# Patient Record
Sex: MALE | Race: WHITE | HISPANIC OR LATINO | Employment: FULL TIME | URBAN - METROPOLITAN AREA
[De-identification: names, ages, dates, MRNs, and addresses within clinical notes are randomized per-mention and may not be internally consistent; named-entity substitution may affect disease eponyms.]

---

## 2018-10-06 ENCOUNTER — OCCUPATIONAL HEALTH (OUTPATIENT)
Dept: URGENT CARE | Facility: CLINIC | Age: 28
End: 2018-10-06

## 2018-10-06 DIAGNOSIS — Z02.1 PRE-EMPLOYMENT DRUG SCREENING: Primary | ICD-10-CM

## 2018-10-06 PROCEDURE — 80305 DRUG TEST PRSMV DIR OPT OBS: CPT | Mod: S$GLB,,, | Performed by: EMERGENCY MEDICINE

## 2020-07-01 ENCOUNTER — HOSPITAL ENCOUNTER (EMERGENCY)
Facility: HOSPITAL | Age: 30
Discharge: HOME | End: 2020-07-01
Attending: EMERGENCY MEDICINE

## 2020-07-01 VITALS
WEIGHT: 250 LBS | HEART RATE: 97 BPM | DIASTOLIC BLOOD PRESSURE: 77 MMHG | SYSTOLIC BLOOD PRESSURE: 143 MMHG | TEMPERATURE: 98 F | OXYGEN SATURATION: 98 % | RESPIRATION RATE: 18 BRPM

## 2020-07-01 DIAGNOSIS — Z91.030 BEE STING ALLERGY: Primary | ICD-10-CM

## 2020-07-01 PROCEDURE — 25000003 PHARM REV CODE 250: Performed by: EMERGENCY MEDICINE

## 2020-07-01 PROCEDURE — 63600175 PHARM REV CODE 636 W HCPCS: Performed by: EMERGENCY MEDICINE

## 2020-07-01 PROCEDURE — 99283 EMERGENCY DEPT VISIT LOW MDM: CPT

## 2020-07-01 RX ORDER — EPINEPHRINE 0.3 MG/.3ML
1 INJECTION SUBCUTANEOUS
Qty: 1 DEVICE | Refills: 1 | Status: SHIPPED | OUTPATIENT
Start: 2020-07-01 | End: 2021-07-01

## 2020-07-01 RX ORDER — DIPHENHYDRAMINE HCL 25 MG
25 CAPSULE ORAL
Status: COMPLETED | OUTPATIENT
Start: 2020-07-01 | End: 2020-07-01

## 2020-07-01 RX ORDER — FAMOTIDINE 20 MG/1
20 TABLET, FILM COATED ORAL
Status: COMPLETED | OUTPATIENT
Start: 2020-07-01 | End: 2020-07-01

## 2020-07-01 RX ORDER — PREDNISONE 20 MG/1
60 TABLET ORAL
Status: COMPLETED | OUTPATIENT
Start: 2020-07-01 | End: 2020-07-01

## 2020-07-01 RX ADMIN — FAMOTIDINE 20 MG: 20 TABLET, FILM COATED ORAL at 03:07

## 2020-07-01 RX ADMIN — DIPHENHYDRAMINE HYDROCHLORIDE 25 MG: 25 CAPSULE ORAL at 03:07

## 2020-07-01 RX ADMIN — PREDNISONE 60 MG: 20 TABLET ORAL at 03:07

## 2020-07-01 NOTE — ED NOTES
Mouth/Throat: Oropharynx is clear and moist and mucous membranes are normal. No posterior oropharyngeal edema.   No facial edema.   Eyes: EOM are normal.   Neck: Neck supple. No stridor present. No tracheal deviation present. No JVD present.   No stridor.   Cardiovascular: Normal rate, regular rhythm, normal heart sounds and intact distal pulses. Exam reveals no gallop and no friction rub.    No murmur heard.  Pulmonary/Chest: Breath sounds normal. No respiratory distress. He has no wheezes. He has no rhonchi. He has no rales.   Clear breath sounds bilaterally. No wheezing.   Abdominal: Soft. Bowel sounds are normal. There is no abdominal tenderness.   Musculoskeletal: Normal range of motion.        Left hand: He exhibits normal range of motion. Left middle finger: Exhibits swelling.      Comments: Full ROM in left hand. No evidence of foreign body or bee sting in finger.    Neurological: He is alert and oriented to person, place, and time. No cranial nerve deficit.   Sensation intact to light touch.   Skin: Skin is warm and dry. Capillary refill takes less than 2 seconds. No rash noted.   Normal perfusion.

## 2020-07-01 NOTE — ED PROVIDER NOTES
Encounter Date: 7/1/2020    SCRIBE #1 NOTE: I, Alexa Swain, am scribing for, and in the presence of, Darion Gallardo MD.       History     Chief Complaint   Patient presents with    Allergic Reaction     pt states he was stung by a bee; took epi pen PTA; told by his PCP that he needed to come to ED for an evaluation. pt denies any respiratory sx. Swelling noted to left middle finger at bee sting site     Time seen by provider: 2:48 PM on 07/01/2020    Howard Butterfield is a 29 y.o. male with no PMHx who presents to the ED with an onset of allergic reaction that occurred 1 hour PTA. The patient reports getting stunk by a bee on his left middle finger. The patient is allergic to bees and was told by his PCP whenever he gets stung to seek medical attention. He noticed swelling to the finger, and used his EPI pen ~40 min PTA. Patient reports being stung by a bee when he was younger, and went into anaphylaxis shock. The patient denies any other rashes. Patient denies syncope, chest pain, nausea, vomiting, abdominal pain, difficulty breathing or any other symptoms at this time. No PSHx.      The history is provided by the patient.     Review of patient's allergies indicates:   Allergen Reactions    Bee pollens      No past medical history on file.  No past surgical history on file.  No family history on file.  Social History     Tobacco Use    Smoking status: Not on file   Substance Use Topics    Alcohol use: Not on file    Drug use: Not on file     Review of Systems   Constitutional: Negative for activity change, diaphoresis and fever.   HENT: Negative for drooling, facial swelling, rhinorrhea, sore throat and trouble swallowing.    Eyes: Negative for pain and visual disturbance.   Respiratory: Negative for cough, shortness of breath and stridor.    Cardiovascular: Negative for chest pain and leg swelling.   Gastrointestinal: Negative for abdominal distention, abdominal pain, constipation, nausea and vomiting.    Genitourinary: Negative for discharge, dysuria and hematuria.   Musculoskeletal: Positive for joint swelling. Negative for gait problem.   Skin: Positive for rash.   Neurological: Negative for seizures, syncope, facial asymmetry and headaches.   Psychiatric/Behavioral: Negative for hallucinations and suicidal ideas.       Physical Exam     Initial Vitals [07/01/20 1444]   BP Pulse Resp Temp SpO2   (!) 157/84 (!) 114 18 98.2 °F (36.8 °C) 99 %      MAP       --         Physical Exam    Nursing note and vitals reviewed.  Constitutional: He appears well-developed. No distress.   HENT:   Head: Normocephalic and atraumatic.   Nose: Nose normal.   Mouth/Throat: Oropharynx is clear and moist and mucous membranes are normal. No posterior oropharyngeal edema.   No facial edema.   Eyes: EOM are normal.   Neck: Neck supple. No stridor present. No tracheal deviation present. No JVD present.   No stridor.   Cardiovascular: Normal rate, regular rhythm, normal heart sounds and intact distal pulses. Exam reveals no gallop and no friction rub.    No murmur heard.  Pulmonary/Chest: Breath sounds normal. No respiratory distress. He has no wheezes. He has no rhonchi. He has no rales.   Clear breath sounds bilaterally. No wheezing.   Abdominal: Soft. Bowel sounds are normal. There is no abdominal tenderness.   Musculoskeletal: Normal range of motion.        Left hand: He exhibits normal range of motion. Left middle finger: Exhibits swelling.      Comments: Full ROM in left hand. No evidence of foreign body or bee sting in finger.    Neurological: He is alert and oriented to person, place, and time. No cranial nerve deficit.   Sensation intact to light touch.   Skin: Skin is warm and dry. Capillary refill takes less than 2 seconds. No rash noted.   Normal perfusion.   Psychiatric: He has a normal mood and affect.         ED Course   Procedures  Labs Reviewed - No data to display       Imaging Results    None          Medical Decision  Making:   History:   Old Medical Records: I decided to obtain old medical records.  ED Management:  28 yo M pmhx of allergic reaction to bee sting pw bee sting. Pt took epipen at home.   Negative for respiratory symptoms such as wheezing, SOB/respiratory distress  Negative for cardiovascular signs such as hypotension  Negative for GI symptoms such as nausea and diarrhea  Low concern for cellulitis. Not anaphylactic. Unlikely drug reaction, toxic shock syndrome, contact dermatitis.  Patient observed in the ER and had improvement of symptoms after being given Benadryl, Pepcid and steroids.              Scribe Attestation:   Scribe #1: I performed the above scribed service and the documentation accurately describes the services I performed. I attest to the accuracy of the note.      Attending Attestation:     Physician Attestation for Scribe:    I, Dr. Darion Gallardo, personally performed the services described in this documentation.   All medical record entries made by the scribe were at my direction and in my presence.   I have reviewed the chart and agree that the record is accurate and complete.   Darion Gallardo MD  4:35 PM 07/01/2020     DISCLAIMER: This note was prepared with [employer] voice recognition transcription software. Garbled syntax, mangled pronouns, and other bizarre constructions may be attributed to that software system.                        Clinical Impression:       ICD-10-CM ICD-9-CM   1. Bee sting allergy  Z91.030 V15.06             ED Disposition Condition    Discharge Stable        ED Prescriptions     Medication Sig Dispense Start Date End Date Auth. Provider    EPINEPHrine (EPIPEN) 0.3 mg/0.3 mL AtIn Inject 0.3 mLs (0.3 mg total) into the muscle as needed. 1 Device 7/1/2020 7/1/2021 Darion Gallardo MD        Follow-up Information     Follow up With Specialties Details Why Contact Info    [facility]  Go in 1 day  [address]  [facility] [address]  [phone]      [facility] Emergency Medicine Go to  As needed, If symptoms worsen [address]  [facility] [address] [phone]                                     Darion Gallardo MD  07/01/20 1640

## 2022-09-21 ENCOUNTER — OCCUPATIONAL HEALTH (OUTPATIENT)
Dept: URGENT CARE | Facility: CLINIC | Age: 32
End: 2022-09-21

## 2022-09-21 PROCEDURE — 80305 PR DRUG SCREEN - 1: ICD-10-PCS | Mod: S$GLB,,, | Performed by: EMERGENCY MEDICINE

## 2022-09-21 PROCEDURE — 80305 DRUG TEST PRSMV DIR OPT OBS: CPT | Mod: S$GLB,,, | Performed by: EMERGENCY MEDICINE

## 2023-04-02 ENCOUNTER — HOSPITAL ENCOUNTER (EMERGENCY)
Facility: HOSPITAL | Age: 33
Discharge: HOME | End: 2023-04-02
Attending: EMERGENCY MEDICINE
Payer: COMMERCIAL

## 2023-04-02 VITALS
WEIGHT: 250 LBS | HEART RATE: 71 BPM | SYSTOLIC BLOOD PRESSURE: 129 MMHG | TEMPERATURE: 98 F | DIASTOLIC BLOOD PRESSURE: 78 MMHG | RESPIRATION RATE: 16 BRPM | BODY MASS INDEX: 33.13 KG/M2 | HEIGHT: 73 IN | OXYGEN SATURATION: 96 %

## 2023-04-02 DIAGNOSIS — N20.0 KIDNEY STONE: Primary | ICD-10-CM

## 2023-04-02 DIAGNOSIS — I88.0 MESENTERIC ADENITIS: ICD-10-CM

## 2023-04-02 LAB
ALBUMIN SERPL BCP-MCNC: 3.6 G/DL (ref 3.5–5.2)
ALP SERPL-CCNC: 78 U/L (ref 55–135)
ALT SERPL W/O P-5'-P-CCNC: 15 U/L (ref 10–44)
ANION GAP SERPL CALC-SCNC: 8 MMOL/L (ref 8–16)
AST SERPL-CCNC: 14 U/L (ref 10–40)
BACTERIA #/AREA URNS HPF: ABNORMAL /HPF
BASOPHILS # BLD AUTO: 0.05 K/UL (ref 0–0.2)
BASOPHILS NFR BLD: 0.7 % (ref 0–1.9)
BILIRUB SERPL-MCNC: 0.4 MG/DL (ref 0.1–1)
BILIRUB UR QL STRIP: NEGATIVE
BUN SERPL-MCNC: 11 MG/DL (ref 6–20)
CALCIUM SERPL-MCNC: 9.1 MG/DL (ref 8.7–10.5)
CHLORIDE SERPL-SCNC: 107 MMOL/L (ref 95–110)
CLARITY UR: CLEAR
CO2 SERPL-SCNC: 27 MMOL/L (ref 23–29)
COLOR UR: YELLOW
CREAT SERPL-MCNC: 1.2 MG/DL (ref 0.5–1.4)
DIFFERENTIAL METHOD: ABNORMAL
EOSINOPHIL # BLD AUTO: 0.3 K/UL (ref 0–0.5)
EOSINOPHIL NFR BLD: 4.3 % (ref 0–8)
ERYTHROCYTE [DISTWIDTH] IN BLOOD BY AUTOMATED COUNT: 18.3 % (ref 11.5–14.5)
EST. GFR  (NO RACE VARIABLE): >60 ML/MIN/1.73 M^2
GLUCOSE SERPL-MCNC: 92 MG/DL (ref 70–110)
GLUCOSE UR QL STRIP: NEGATIVE
HCT VFR BLD AUTO: 37.4 % (ref 40–54)
HGB BLD-MCNC: 11 G/DL (ref 14–18)
HGB UR QL STRIP: ABNORMAL
IMM GRANULOCYTES # BLD AUTO: 0.01 K/UL (ref 0–0.04)
IMM GRANULOCYTES NFR BLD AUTO: 0.1 % (ref 0–0.5)
KETONES UR QL STRIP: NEGATIVE
LEUKOCYTE ESTERASE UR QL STRIP: NEGATIVE
LYMPHOCYTES # BLD AUTO: 2.8 K/UL (ref 1–4.8)
LYMPHOCYTES NFR BLD: 42 % (ref 18–48)
MCH RBC QN AUTO: 21.2 PG (ref 27–31)
MCHC RBC AUTO-ENTMCNC: 29.4 G/DL (ref 32–36)
MCV RBC AUTO: 72 FL (ref 82–98)
MICROSCOPIC COMMENT: ABNORMAL
MONOCYTES # BLD AUTO: 0.6 K/UL (ref 0.3–1)
MONOCYTES NFR BLD: 8.5 % (ref 4–15)
NEUTROPHILS # BLD AUTO: 3 K/UL (ref 1.8–7.7)
NEUTROPHILS NFR BLD: 44.4 % (ref 38–73)
NITRITE UR QL STRIP: NEGATIVE
NRBC BLD-RTO: 0 /100 WBC
PH UR STRIP: 6 [PH] (ref 5–8)
PLATELET # BLD AUTO: 215 K/UL (ref 150–450)
PMV BLD AUTO: 11.7 FL (ref 9.2–12.9)
POTASSIUM SERPL-SCNC: 4.1 MMOL/L (ref 3.5–5.1)
PROT SERPL-MCNC: 7.1 G/DL (ref 6–8.4)
PROT UR QL STRIP: NEGATIVE
RBC # BLD AUTO: 5.2 M/UL (ref 4.6–6.2)
RBC #/AREA URNS HPF: 7 /HPF (ref 0–4)
SODIUM SERPL-SCNC: 142 MMOL/L (ref 136–145)
SP GR UR STRIP: 1.01 (ref 1–1.03)
SQUAMOUS #/AREA URNS HPF: 1 /HPF
URN SPEC COLLECT METH UR: ABNORMAL
UROBILINOGEN UR STRIP-ACNC: NEGATIVE EU/DL
WBC # BLD AUTO: 6.74 K/UL (ref 3.9–12.7)
WBC #/AREA URNS HPF: 2 /HPF (ref 0–5)

## 2023-04-02 PROCEDURE — 36415 COLL VENOUS BLD VENIPUNCTURE: CPT | Performed by: EMERGENCY MEDICINE

## 2023-04-02 PROCEDURE — 81000 URINALYSIS NONAUTO W/SCOPE: CPT | Performed by: EMERGENCY MEDICINE

## 2023-04-02 PROCEDURE — 80053 COMPREHEN METABOLIC PANEL: CPT | Performed by: EMERGENCY MEDICINE

## 2023-04-02 PROCEDURE — 99284 EMERGENCY DEPT VISIT MOD MDM: CPT | Mod: 25

## 2023-04-02 PROCEDURE — 85025 COMPLETE CBC W/AUTO DIFF WBC: CPT | Performed by: EMERGENCY MEDICINE

## 2023-04-02 NOTE — ED NOTES
Assumed care--reports previous episode of Lt flank/Lt lower abd pain (atraumatic)--denies pain @ present. Wife in attendance @ bedside.

## 2023-04-02 NOTE — ED PROVIDER NOTES
Encounter Date: 4/2/2023       History     Chief Complaint   Patient presents with    Flank Pain     Left  / started this am      HPI 32-year-old man with a history of previous kidney stones presents emergency department complaining of acute onset of severe left flank pain that radiates to his left lower abdomen this morning.  No associated fever, nausea or hematuria.  Patient urinated and the pain subsided proximally 10-15 minutes afterwards.  Review of patient's allergies indicates:   Allergen Reactions    Bee pollens      No past medical history on file.  No past surgical history on file.  No family history on file.     Review of Systems   Constitutional:  Negative for fever.   HENT:  Negative for sore throat.    Respiratory:  Negative for shortness of breath.    Cardiovascular:  Negative for chest pain.   Gastrointestinal:  Negative for nausea.   Genitourinary:  Positive for flank pain. Negative for dysuria.   Musculoskeletal:  Negative for back pain.   Skin:  Negative for rash.   Neurological:  Negative for weakness.   Hematological:  Does not bruise/bleed easily.     Physical Exam     Initial Vitals [04/02/23 0639]   BP Pulse Resp Temp SpO2   131/84 78 18 98.1 °F (36.7 °C) 98 %      MAP       --         Physical Exam    Nursing note and vitals reviewed.  Constitutional: He appears well-developed and well-nourished.   HENT:   Head: Normocephalic and atraumatic.   Eyes: EOM are normal. Pupils are equal, round, and reactive to light.   Neck: Neck supple.   Cardiovascular:  Normal rate and regular rhythm.           Pulmonary/Chest: Breath sounds normal. No respiratory distress.   Abdominal: Abdomen is soft. He exhibits no distension. There is no abdominal tenderness.   No right CVA tenderness.  No left CVA tenderness. There is no rebound and no guarding.   Musculoskeletal:         General: Normal range of motion.      Cervical back: Neck supple.     Neurological: He is alert and oriented to person, place, and  time.   Skin: Skin is warm and dry.       ED Course   Procedures  Labs Reviewed   CBC W/ AUTO DIFFERENTIAL - Abnormal; Notable for the following components:       Result Value    Hemoglobin 11.0 (*)     Hematocrit 37.4 (*)     MCV 72 (*)     MCH 21.2 (*)     MCHC 29.4 (*)     RDW 18.3 (*)     All other components within normal limits   URINALYSIS, REFLEX TO URINE CULTURE - Abnormal; Notable for the following components:    Occult Blood UA 1+ (*)     All other components within normal limits    Narrative:     Specimen Source->Urine   URINALYSIS MICROSCOPIC - Abnormal; Notable for the following components:    RBC, UA 7 (*)     All other components within normal limits    Narrative:     Specimen Source->Urine   COMPREHENSIVE METABOLIC PANEL          Imaging Results              CT Renal Stone Study ABD Pelvis WO (Final result)  Result time 04/02/23 07:56:58      Final result by Mayito Glover MD (04/02/23 07:56:58)                   Impression:      1. Punctate left renal stone.  No obstructive uropathy.  2. Increased number of normal-sized mesenteric lymph nodes.  Mesenteric adenitis is a consideration in the appropriate clinical setting.  3. Hepatomegaly.      Electronically signed by: Mayito Glover  Date:    04/02/2023  Time:    07:56               Narrative:    EXAMINATION:  CT RENAL STONE STUDY ABD PELVIS WO    CLINICAL HISTORY:  Flank pain, kidney stone suspected;    TECHNIQUE:  Low dose axial images, sagittal and coronal reformations were obtained from the lung bases to the pubic symphysis.  Contrast was not administered.    COMPARISON:  None    FINDINGS:  Lung bases clear.  Normal size heart.  There is a fold near the gallbladder tip with some intraluminal density which could reflect sludge.  No calcified stones in the gallbladder.    Liver is 22 cm in length.  No hydroureteronephrosis.  1 mm left nephrolith.  No calcified ureterolith.  Remaining solid abdominal organs are unremarkable.   Noncontrast    There is no enteric contrast which limits bowel assessment.  No dilated bowel loops.  Normal appendix.    There is an increased number prominent to borderline enlarged mesenteric lymph nodes especially in the right mid abdomen.  A reference in the right mid abdomen measures 8 mm short axis series 2, image 117.  Small fat containing umbilical defect.  Normal size prostate.  Decompressed urinary bladder.    Bilateral L5 pars defects and minimal grade 1 anterolisthesis L5 on S1.                                       Medications - No data to display  Medical Decision Making:   History:   Old Medical Records: I decided to obtain old medical records.  Initial Assessment:   32-year-old man presents emergency department complaining of flank pain.  Differential Diagnosis:   Pyelonephritis, UTI, kidney stone, renal abscess  Clinical Tests:   Lab Tests: Ordered and Reviewed       <> Summary of Lab: White blood cell count 6.74 GFR greater than 60 urinalysis with 2 white blood cells, 7 red blood cells rare bacteria  Radiological Study: Ordered and Reviewed  ED Management:  CT renal stone shows punctate left renal stone no obstructive uropathy and mesenteric lymph nodes that are increased in size considering mesenteric adenitis  Patient likely passed a renal stone.  He is not requiring any pain control.  He is appropriate for discharge in Urology follow-up.  Considered admission but he is not required.  Discharged in no acute distress.                        Clinical Impression:   Final diagnoses:  [N20.0] Kidney stone (Primary)  [I88.0] Mesenteric adenitis        ED Disposition Condition    Discharge Stable          ED Prescriptions    None       Follow-up Information       Follow up With Specialties Details Why Contact [facility] Emergency Dept Emergency Medicine  As needed, If symptoms worsen [address] [place] [id number] [phone]    Xavier Cortez MD Urology Schedule  an appointment as soon as possible for a visit   [address] [place] [address] [address]  [place] [address]  [phone]               Bernardo Hull MD  04/02/23 3596